# Patient Record
Sex: FEMALE | URBAN - METROPOLITAN AREA
[De-identification: names, ages, dates, MRNs, and addresses within clinical notes are randomized per-mention and may not be internally consistent; named-entity substitution may affect disease eponyms.]

---

## 2018-04-17 ENCOUNTER — APPOINTMENT (RX ONLY)
Dept: URBAN - METROPOLITAN AREA CLINIC 45 | Facility: CLINIC | Age: 58
Setting detail: DERMATOLOGY
End: 2018-04-17

## 2018-04-17 DIAGNOSIS — L20.89 OTHER ATOPIC DERMATITIS: ICD-10-CM | Status: INADEQUATELY CONTROLLED

## 2018-04-17 DIAGNOSIS — L71.8 OTHER ROSACEA: ICD-10-CM | Status: INADEQUATELY CONTROLLED

## 2018-04-17 PROBLEM — L29.8 OTHER PRURITUS: Status: ACTIVE | Noted: 2018-04-17

## 2018-04-17 PROBLEM — F41.9 ANXIETY DISORDER, UNSPECIFIED: Status: ACTIVE | Noted: 2018-04-17

## 2018-04-17 PROBLEM — L85.3 XEROSIS CUTIS: Status: ACTIVE | Noted: 2018-04-17

## 2018-04-17 PROBLEM — J30.1 ALLERGIC RHINITIS DUE TO POLLEN: Status: ACTIVE | Noted: 2018-04-17

## 2018-04-17 PROBLEM — L20.84 INTRINSIC (ALLERGIC) ECZEMA: Status: ACTIVE | Noted: 2018-04-17

## 2018-04-17 PROBLEM — L23.7 ALLERGIC CONTACT DERMATITIS DUE TO PLANTS, EXCEPT FOOD: Status: ACTIVE | Noted: 2018-04-17

## 2018-04-17 PROBLEM — J45.909 UNSPECIFIED ASTHMA, UNCOMPLICATED: Status: ACTIVE | Noted: 2018-04-17

## 2018-04-17 PROBLEM — M12.9 ARTHROPATHY, UNSPECIFIED: Status: ACTIVE | Noted: 2018-04-17

## 2018-04-17 PROCEDURE — ? TREATMENT REGIMEN

## 2018-04-17 PROCEDURE — ? PRESCRIPTION

## 2018-04-17 PROCEDURE — 99202 OFFICE O/P NEW SF 15 MIN: CPT

## 2018-04-17 PROCEDURE — ? COUNSELING

## 2018-04-17 RX ORDER — FLUOCINONIDE 0.5 MG/ML
OINTMENT TOPICAL
Qty: 1 | Refills: 0 | Status: ERX | COMMUNITY
Start: 2018-04-17

## 2018-04-17 RX ORDER — IVERMECTIN 10 MG/G
CREAM TOPICAL QD
Qty: 1 | Refills: 2 | Status: ERX | COMMUNITY
Start: 2018-04-17

## 2018-04-17 RX ORDER — DOXYCYCLINE 40 MG/1
CAPSULE ORAL
Qty: 30 | Refills: 2 | Status: ERX | COMMUNITY
Start: 2018-04-17

## 2018-04-17 RX ADMIN — DOXYCYCLINE: 40 CAPSULE ORAL at 20:01

## 2018-04-17 RX ADMIN — FLUOCINONIDE: 0.5 OINTMENT TOPICAL at 19:57

## 2018-04-17 RX ADMIN — IVERMECTIN: 10 CREAM TOPICAL at 20:01

## 2018-04-17 ASSESSMENT — SEVERITY ASSESSMENT OVERALL AMONG ALL PATIENTS
IN YOUR EXPERIENCE, AMONG ALL PATIENTS YOU HAVE SEEN WITH THIS CONDITION, HOW SEVERE IS THIS PATIENT'S CONDITION?: MILD TO MODERATE

## 2018-04-17 ASSESSMENT — LOCATION DETAILED DESCRIPTION DERM
LOCATION DETAILED: LEFT INFERIOR CENTRAL MALAR CHEEK
LOCATION DETAILED: LEFT KNEE

## 2018-04-17 ASSESSMENT — LOCATION ZONE DERM
LOCATION ZONE: FACE
LOCATION ZONE: LEG

## 2018-04-17 ASSESSMENT — LOCATION SIMPLE DESCRIPTION DERM
LOCATION SIMPLE: LEFT KNEE
LOCATION SIMPLE: LEFT CHEEK

## 2018-04-17 ASSESSMENT — SEVERITY ASSESSMENT: SEVERITY: MILD

## 2018-04-17 NOTE — PROCEDURE: TREATMENT REGIMEN
Otc Regimen: Antihistamine QD
Detail Level: Zone
Plan: Discussed possibly initiating Retin-A in future pending results with current treatment

## 2018-04-17 NOTE — PROCEDURE: MIPS QUALITY
Quality 110: Preventive Care And Screening: Influenza Immunization: Influenza immunization was not ordered or administered, reason not given
Detail Level: Detailed
Additional Notes: Patient states she was advised not to receive vaccine do to recent knee replacement